# Patient Record
Sex: MALE | Race: WHITE | Employment: FULL TIME | URBAN - METROPOLITAN AREA
[De-identification: names, ages, dates, MRNs, and addresses within clinical notes are randomized per-mention and may not be internally consistent; named-entity substitution may affect disease eponyms.]

---

## 2021-03-04 ENCOUNTER — HOSPITAL ENCOUNTER (OUTPATIENT)
Dept: PREADMISSION TESTING | Age: 36
Discharge: HOME | End: 2021-03-04

## 2021-03-04 VITALS
HEART RATE: 66 BPM | RESPIRATION RATE: 20 BRPM | WEIGHT: 194.45 LBS | SYSTOLIC BLOOD PRESSURE: 131 MMHG | TEMPERATURE: 97.5 F | DIASTOLIC BLOOD PRESSURE: 65 MMHG | OXYGEN SATURATION: 96 % | BODY MASS INDEX: 27.84 KG/M2 | HEIGHT: 70 IN

## 2021-03-04 RX ORDER — BISMUTH SUBSALICYLATE 262 MG
1 TABLET,CHEWABLE ORAL DAILY
COMMUNITY

## 2021-03-04 RX ORDER — ASPIRIN 325 MG
650 TABLET ORAL AS NEEDED
COMMUNITY
End: 2021-03-12

## 2021-03-04 NOTE — PERIOP NOTES
[address] , [address] [facility]   MAIN OR                                  [phone]   MAIN PRE OP                          [phone]                                                                                AMBULATORY PRE OP          [phone]  PRE-ADMISSION TESTING    [phone]   Surgery Date:  Friday 3/12/21      Is surgery arrival time given by surgeon? NO  If [facility] staff will call you Thursday 3/11/21  between 3 and 7pm the day before your surgery with your arrival time. (If your surgery is on a Monday, we will call you the Friday before.)    Call [phone] after 7pm Monday-Friday if you did not receive this call. INSTRUCTIONS BEFORE YOUR SURGERY   When You  Arrive Arrive at the [address] [facility] on the day of your surgery  Have your insurance card, photo ID, and any copayment (if needed)   Food   and   Drink NO food or drink after midnight the night before surgery    This means NO water, gum, mints, coffee, juice, etc.  No alcohol (beer, wine, liquor) 24 hours before and after surgery   Medications to   TAKE   Morning of Surgery MEDICATIONS TO TAKE THE MORNING OF SURGERY WITH A SIP OF WATER:    none   Medications  To  STOP      7 days before surgery  Non-Steroidal anti-inflammatory Drugs (NSAID's): for example, Ibuprofen (Advil, Motrin), Naproxen (Aleve)   Aspirin, if taking for pain    Herbal supplements, vitamins, and fish oil     Blood  Thinners  If you take  Aspirin, Plavix, Coumadin, or any blood-thinning or anti-blood clot medicine, talk to the doctor who prescribed the medications for pre-operative instructions.    Bathing Clothing  Jewelry  Valuables      MAY shower the morning of surgery, please do not apply anything to your skin (lotions, powders, deodorant, or makeup, especially mascara)   Follow Chlorhexidine Care Fusion body wash instructions provided to you during PAT appointment. Begin 3 days prior to surgery.  • Do not shave or trim anywhere 24 hours before surgery  • Wear your hair loose or down; no pony-tails, buns, or metal hair clips  • Wear loose, comfortable, clean clothes  • Wear glasses instead of contacts  • Leave money, valuables, and jewelry, including body piercings, at home   Going Home - or Spending the Night • SAME-DAY SURGERY: You must have a responsible adult drive you home and stay with you 24 hours after surgery  • ADMITS: If your doctor is keeping you in the hospital after surgery, leave personal belongings/luggage in your car until you have a hospital room number.    Hospital discharge time is 12 noon  Drivers must be here before 12 noon unless you are told differently   Special Instructions · Bring PTA Medication list day of surgery with the last doses taken documented   · Do not bring medication bottles the day of surgery          It is now mandated that all surgical patients be tested for COVID-19 prior to surgery. Testing has to be exactly 4 days prior to surgery.       Your COVID test date is Monday 3/8/21 between 8:00 am and 11:00 am.       COVID testing will be performed curbside at the [facility] entrance. There will be signs leading you to the testing site. You will need to bring a photo ID with you to be swabbed.       Patients are advised to self-quarantine at home after testing and prior to your surgery date. You will be notified if your results are positive.    What to watch for:  • Coronavirus (COVID-19) affects different people in different ways  • It also appears with a wide range of symptoms from mild to severe  • Signs usually appear 2-14 days after exposure    • If you develop any of the following, notify your doctor immediately:  o Fever  o Chills, with or without a shiver  o Muscle pain  o Headache  o Sore throat  o Dry cough  o New loss of taste or smell  o Tiredness    •  If you develop any of the following, call  911:  o Shortness of breath  o Difficulty breathing  o Chest pain  o New confusion  o Blueness of fingers and/or lips     Follow all instructions so your surgery wont be cancelled. Please, be on time. If a situation occurs and you are delayed the day of surgery, call [phone] or [phone]. If your physical condition changes (like a fever, cold, flu, etc.) call your surgeon. Home medication(s) reviewed and verified via     LIST   VERBAL   during PAT appointment. The patient was contacted by      IN-PERSON  The patient verbalizes understanding of all instructions and      DOES NOT   need reinforcement.
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
## 2021-03-08 ENCOUNTER — HOSPITAL ENCOUNTER (OUTPATIENT)
Dept: PREADMISSION TESTING | Age: 36
Discharge: HOME | End: 2021-03-08
Payer: COMMERCIAL

## 2021-03-08 PROCEDURE — U0003 INFECTIOUS AGENT DETECTION BY NUCLEIC ACID (DNA OR RNA); SEVERE ACUTE RESPIRATORY SYNDROME CORONAVIRUS 2 (SARS-COV-2) (CORONAVIRUS DISEASE [COVID-19]), AMPLIFIED PROBE TECHNIQUE, MAKING USE OF HIGH THROUGHPUT TECHNOLOGIES AS DESCRIBED BY CMS-2020-01-R: HCPCS

## 2021-03-09 LAB — SARS-COV-2, COV2NT: NOT DETECTED

## 2021-03-11 ENCOUNTER — ANESTHESIA EVENT (OUTPATIENT)
Dept: SURGERY | Age: 36
End: 2021-03-11
Payer: COMMERCIAL

## 2021-03-12 ENCOUNTER — ANESTHESIA (OUTPATIENT)
Dept: SURGERY | Age: 36
End: 2021-03-12
Payer: COMMERCIAL

## 2021-03-12 ENCOUNTER — APPOINTMENT (OUTPATIENT)
Dept: GENERAL RADIOLOGY | Age: 36
End: 2021-03-12
Attending: PODIATRIST
Payer: COMMERCIAL

## 2021-03-12 ENCOUNTER — HOSPITAL ENCOUNTER (OUTPATIENT)
Age: 36
Setting detail: OUTPATIENT SURGERY
Discharge: HOME | End: 2021-03-12
Attending: PODIATRIST | Admitting: PODIATRIST
Payer: COMMERCIAL

## 2021-03-12 VITALS
RESPIRATION RATE: 20 BRPM | DIASTOLIC BLOOD PRESSURE: 73 MMHG | HEART RATE: 71 BPM | TEMPERATURE: 97.6 F | BODY MASS INDEX: 27.9 KG/M2 | WEIGHT: 194.45 LBS | SYSTOLIC BLOOD PRESSURE: 130 MMHG | OXYGEN SATURATION: 92 %

## 2021-03-12 PROBLEM — M20.21 HALLUX RIGIDUS, RIGHT FOOT: Status: ACTIVE | Noted: 2021-03-12

## 2021-03-12 PROBLEM — M79.671 RIGHT FOOT PAIN: Status: ACTIVE | Noted: 2021-03-12

## 2021-03-12 PROBLEM — M20.5X1 OTHER DEFORMITIES OF TOE(S) (ACQUIRED), RIGHT FOOT: Status: ACTIVE | Noted: 2021-03-12

## 2021-03-12 PROBLEM — Q66.211: Status: ACTIVE | Noted: 2021-03-12

## 2021-03-12 PROBLEM — M20.11 HALLUX VALGUS OF RIGHT FOOT: Status: ACTIVE | Noted: 2021-03-12

## 2021-03-12 PROCEDURE — 77030040361 HC SLV COMPR DVT MDII -B

## 2021-03-12 PROCEDURE — 2709999900 HC NON-CHARGEABLE SUPPLY: Performed by: PODIATRIST

## 2021-03-12 PROCEDURE — 74011250636 HC RX REV CODE- 250/636: Performed by: ANESTHESIOLOGY

## 2021-03-12 PROCEDURE — 73630 X-RAY EXAM OF FOOT: CPT

## 2021-03-12 PROCEDURE — 74011250636 HC RX REV CODE- 250/636: Performed by: NURSE ANESTHETIST, CERTIFIED REGISTERED

## 2021-03-12 PROCEDURE — 77030037938 HC BLD OSC SAW TREA -B: Performed by: PODIATRIST

## 2021-03-12 PROCEDURE — 77030010507 HC ADH SKN DERMBND J&J -B: Performed by: PODIATRIST

## 2021-03-12 PROCEDURE — 77030031139 HC SUT VCRL2 J&J -A: Performed by: PODIATRIST

## 2021-03-12 PROCEDURE — 77030003601 HC NDL NRV BLK BBMI -A

## 2021-03-12 PROCEDURE — 76060000036 HC ANESTHESIA 2.5 TO 3 HR: Performed by: PODIATRIST

## 2021-03-12 PROCEDURE — 77030013837 HC NERV BLK KT BBMI -B

## 2021-03-12 PROCEDURE — C1734 ORTH/DEVIC/DRUG BN/BN,TIS/BN: HCPCS | Performed by: PODIATRIST

## 2021-03-12 PROCEDURE — 74011000250 HC RX REV CODE- 250: Performed by: NURSE ANESTHETIST, CERTIFIED REGISTERED

## 2021-03-12 PROCEDURE — 77030002916 HC SUT ETHLN J&J -A: Performed by: PODIATRIST

## 2021-03-12 PROCEDURE — 77030016524 HC BIT DRL4 STRY -C: Performed by: PODIATRIST

## 2021-03-12 PROCEDURE — 77030002922 HC SUT FBRWRE ARTH -B: Performed by: PODIATRIST

## 2021-03-12 PROCEDURE — C1713 ANCHOR/SCREW BN/BN,TIS/BN: HCPCS | Performed by: PODIATRIST

## 2021-03-12 PROCEDURE — 77030002986 HC SUT PROL J&J -A: Performed by: PODIATRIST

## 2021-03-12 PROCEDURE — 74011000250 HC RX REV CODE- 250: Performed by: ANESTHESIOLOGY

## 2021-03-12 PROCEDURE — 77030006773 HC BLD SAW OSC BRSM -A: Performed by: PODIATRIST

## 2021-03-12 PROCEDURE — 74011250636 HC RX REV CODE- 250/636

## 2021-03-12 PROCEDURE — 76210000020 HC REC RM PH II FIRST 0.5 HR: Performed by: PODIATRIST

## 2021-03-12 PROCEDURE — 74011250636 HC RX REV CODE- 250/636: Performed by: PODIATRIST

## 2021-03-12 PROCEDURE — 76210000006 HC OR PH I REC 0.5 TO 1 HR: Performed by: PODIATRIST

## 2021-03-12 PROCEDURE — 76010000132 HC OR TIME 2.5 TO 3 HR: Performed by: PODIATRIST

## 2021-03-12 PROCEDURE — 77030040922 HC BLNKT HYPOTHRM STRY -A

## 2021-03-12 PROCEDURE — 77030000032 HC CUF TRNQT ZIMM -B: Performed by: PODIATRIST

## 2021-03-12 PROCEDURE — 77030010507 HC ADH SKN DERMBND J&J -B

## 2021-03-12 PROCEDURE — 74011000250 HC RX REV CODE- 250: Performed by: PODIATRIST

## 2021-03-12 PROCEDURE — 77030031718 HC ROD POS EASYCLP DISP STRY -C: Performed by: PODIATRIST

## 2021-03-12 DEVICE — ANATOMIC BIPLANAR IMPLANTS
Type: IMPLANTABLE DEVICE | Site: FOOT | Status: FUNCTIONAL
Brand: LAPIPLASTY SYSTEM 2

## 2021-03-12 DEVICE — INJECTABLE KIT
Type: IMPLANTABLE DEVICE | Site: FOOT | Status: FUNCTIONAL
Brand: AUGMENT® INJECTABLE

## 2021-03-12 DEVICE — OSTEOSYNTHESIS COMPRESSION STAPLE
Type: IMPLANTABLE DEVICE | Site: FOOT | Status: FUNCTIONAL
Brand: EASY CLIP

## 2021-03-12 RX ORDER — MIDAZOLAM HYDROCHLORIDE 1 MG/ML
INJECTION, SOLUTION INTRAMUSCULAR; INTRAVENOUS AS NEEDED
Status: DISCONTINUED | OUTPATIENT
Start: 2021-03-12 | End: 2021-03-12 | Stop reason: HOSPADM

## 2021-03-12 RX ORDER — SODIUM CHLORIDE, SODIUM LACTATE, POTASSIUM CHLORIDE, CALCIUM CHLORIDE 600; 310; 30; 20 MG/100ML; MG/100ML; MG/100ML; MG/100ML
125 INJECTION, SOLUTION INTRAVENOUS CONTINUOUS
Status: DISCONTINUED | OUTPATIENT
Start: 2021-03-12 | End: 2021-03-12 | Stop reason: HOSPADM

## 2021-03-12 RX ORDER — ROPIVACAINE HYDROCHLORIDE 5 MG/ML
INJECTION, SOLUTION EPIDURAL; INFILTRATION; PERINEURAL AS NEEDED
Status: DISCONTINUED | OUTPATIENT
Start: 2021-03-12 | End: 2021-03-12 | Stop reason: HOSPADM

## 2021-03-12 RX ORDER — PROPOFOL 10 MG/ML
INJECTION, EMULSION INTRAVENOUS AS NEEDED
Status: DISCONTINUED | OUTPATIENT
Start: 2021-03-12 | End: 2021-03-12 | Stop reason: HOSPADM

## 2021-03-12 RX ORDER — FLUMAZENIL 0.1 MG/ML
0.2 INJECTION INTRAVENOUS
Status: DISCONTINUED | OUTPATIENT
Start: 2021-03-12 | End: 2021-03-12 | Stop reason: HOSPADM

## 2021-03-12 RX ORDER — NALOXONE HYDROCHLORIDE 0.4 MG/ML
0.2 INJECTION, SOLUTION INTRAMUSCULAR; INTRAVENOUS; SUBCUTANEOUS
Status: DISCONTINUED | OUTPATIENT
Start: 2021-03-12 | End: 2021-03-12 | Stop reason: HOSPADM

## 2021-03-12 RX ORDER — FENTANYL CITRATE 50 UG/ML
INJECTION, SOLUTION INTRAMUSCULAR; INTRAVENOUS AS NEEDED
Status: DISCONTINUED | OUTPATIENT
Start: 2021-03-12 | End: 2021-03-12 | Stop reason: HOSPADM

## 2021-03-12 RX ORDER — ONDANSETRON 2 MG/ML
INJECTION INTRAMUSCULAR; INTRAVENOUS AS NEEDED
Status: DISCONTINUED | OUTPATIENT
Start: 2021-03-12 | End: 2021-03-12 | Stop reason: HOSPADM

## 2021-03-12 RX ORDER — DIPHENHYDRAMINE HYDROCHLORIDE 50 MG/ML
12.5 INJECTION, SOLUTION INTRAMUSCULAR; INTRAVENOUS AS NEEDED
Status: DISCONTINUED | OUTPATIENT
Start: 2021-03-12 | End: 2021-03-12 | Stop reason: HOSPADM

## 2021-03-12 RX ORDER — HYDROMORPHONE HYDROCHLORIDE 1 MG/ML
.25-1 INJECTION, SOLUTION INTRAMUSCULAR; INTRAVENOUS; SUBCUTANEOUS
Status: DISCONTINUED | OUTPATIENT
Start: 2021-03-12 | End: 2021-03-12 | Stop reason: HOSPADM

## 2021-03-12 RX ORDER — LIDOCAINE HYDROCHLORIDE 10 MG/ML
0.1 INJECTION, SOLUTION EPIDURAL; INFILTRATION; INTRACAUDAL; PERINEURAL AS NEEDED
Status: DISCONTINUED | OUTPATIENT
Start: 2021-03-12 | End: 2021-03-12 | Stop reason: HOSPADM

## 2021-03-12 RX ORDER — ROCURONIUM BROMIDE 10 MG/ML
INJECTION, SOLUTION INTRAVENOUS AS NEEDED
Status: DISCONTINUED | OUTPATIENT
Start: 2021-03-12 | End: 2021-03-12 | Stop reason: HOSPADM

## 2021-03-12 RX ORDER — DEXAMETHASONE SODIUM PHOSPHATE 4 MG/ML
INJECTION, SOLUTION INTRA-ARTICULAR; INTRALESIONAL; INTRAMUSCULAR; INTRAVENOUS; SOFT TISSUE AS NEEDED
Status: DISCONTINUED | OUTPATIENT
Start: 2021-03-12 | End: 2021-03-12 | Stop reason: HOSPADM

## 2021-03-12 RX ADMIN — PROPOFOL 250 MG: 10 INJECTION, EMULSION INTRAVENOUS at 15:28

## 2021-03-12 RX ADMIN — MIDAZOLAM HYDROCHLORIDE 2 MG: 2 INJECTION, SOLUTION INTRAMUSCULAR; INTRAVENOUS at 13:34

## 2021-03-12 RX ADMIN — BUPIVACAINE HYDROCHLORIDE 10 ML/HR: 7.5 INJECTION, SOLUTION EPIDURAL; RETROBULBAR at 18:32

## 2021-03-12 RX ADMIN — ROCURONIUM BROMIDE 50 MG: 10 INJECTION INTRAVENOUS at 15:28

## 2021-03-12 RX ADMIN — SODIUM CHLORIDE, POTASSIUM CHLORIDE, SODIUM LACTATE AND CALCIUM CHLORIDE: 600; 310; 30; 20 INJECTION, SOLUTION INTRAVENOUS at 16:24

## 2021-03-12 RX ADMIN — DEXAMETHASONE SODIUM PHOSPHATE 8 MG: 4 INJECTION, SOLUTION INTRAMUSCULAR; INTRAVENOUS at 15:40

## 2021-03-12 RX ADMIN — FENTANYL CITRATE 100 MCG: 0.05 INJECTION, SOLUTION INTRAMUSCULAR; INTRAVENOUS at 13:34

## 2021-03-12 RX ADMIN — ROPIVACAINE HYDROCHLORIDE 30 ML: 5 INJECTION, SOLUTION EPIDURAL; INFILTRATION; PERINEURAL at 13:41

## 2021-03-12 RX ADMIN — ONDANSETRON HYDROCHLORIDE 4 MG: 2 SOLUTION INTRAMUSCULAR; INTRAVENOUS at 15:39

## 2021-03-12 RX ADMIN — CEFAZOLIN SODIUM 2 G: 1 POWDER, FOR SOLUTION INTRAMUSCULAR; INTRAVENOUS at 15:17

## 2021-03-12 RX ADMIN — ROPIVACAINE HYDROCHLORIDE 10 ML: 5 INJECTION, SOLUTION EPIDURAL; INFILTRATION; PERINEURAL at 13:49

## 2021-03-12 RX ADMIN — SODIUM CHLORIDE, POTASSIUM CHLORIDE, SODIUM LACTATE AND CALCIUM CHLORIDE 125 ML/HR: 600; 310; 30; 20 INJECTION, SOLUTION INTRAVENOUS at 12:55

## 2021-03-12 RX ADMIN — MIDAZOLAM HYDROCHLORIDE 2 MG: 2 INJECTION, SOLUTION INTRAMUSCULAR; INTRAVENOUS at 15:17

## 2021-03-12 NOTE — ANESTHESIA PREPROCEDURE EVALUATION
Relevant Problems   No relevant active problems       Anesthetic History   No history of anesthetic complications            Review of Systems / Medical History  Patient summary reviewed and pertinent labs reviewed    Pulmonary  Within defined limits                 Neuro/Psych   Within defined limits           Cardiovascular  Within defined limits                Exercise tolerance: >4 METS     GI/Hepatic/Renal  Within defined limits              Endo/Other  Within defined limits           Other Findings              Physical Exam    Airway  Mallampati: II  TM Distance: 4 - 6 cm  Neck ROM: normal range of motion   Mouth opening: Normal     Cardiovascular  Regular rate and rhythm,  S1 and S2 normal,  no murmur, click, rub, or gallop  Rhythm: regular  Rate: normal         Dental  No notable dental hx       Pulmonary  Breath sounds clear to auscultation               Abdominal  GI exam deferred       Other Findings            Anesthetic Plan    ASA: 1  Anesthesia type: regional and MAC - popliteal fossa block and saphenous block      Post-op pain plan if not by surgeon: peripheral nerve block continuous    Induction: Intravenous  Anesthetic plan and risks discussed with: Patient

## 2021-03-12 NOTE — BRIEF OP NOTE
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 Procedure(s):  - FIRST TARSOMETATARSAL JOINT FUSION RIGHT FOOT  - BUNIONECTOMY WITH POSSIBLE HALLUX OSTEOTOMY RIGHT FOOT     Surgeon(s):  Angus Lazaro DPM    Surgical Assistant: Surg Asst-1: Camacho Greene LPN    Anesthesia: General LMA with regional block    Estimated Blood Loss (mL): Minimal    Complications: None    Specimens: * No specimens in log *     Implants:   Implant Name Type Inv.  Item Serial No.  Lot No. LRB No. Used Action   INJECTABLE [id number] AUG - SNA  INJECTABLE [id number] AUG NA [id number] Right 1 Implanted   IMPL BIPLANAR MARK STRL -- LAPIPLASTY SYSTEM 2 - SNA  IMPL BIPLANAR MARK STRL -- LAPIPLASTY SYSTEM 2 NA [employer] [id number] Right 1 Implanted   STAPLE INT [id number] 15MM DIA2MM [id number] NIT ANK FT SUP E - SNA  STAPLE INT [id number] 15MM DIA2MM [id number] NIT ANK FT SUP E NA [employer]WD [id number] Right 1 Implanted       Drains: * No LDAs found *    Findings: arthritic 1st MTP joint; improved ROM to the 1st MTP     Electronically Signed by Garry García DPM on 3/12/2021 at 6:25 PM

## 2021-03-12 NOTE — INTERVAL H&P NOTE
Update History & Physical 
 
The Patient's History and Physical of March 4, 2021 was reviewed with the patient and I examined the patient. There was no change. The surgical site was confirmed by the patient and me. Plan:  The risk, benefits, expected outcome, and alternative to the recommended procedure have been discussed with the patient. Patient understands and wants to proceed with the procedure.  
 
Electronically signed by Maricruz Moseley DPM on 3/12/2021 at 12:44 PM

## 2021-03-12 NOTE — PERIOP NOTES
Surgeon notified of Iodine contrast allergy before putting dressing of adaptic soaked in betadine on surgical wound.

## 2021-03-12 NOTE — ANESTHESIA PROCEDURE NOTES
Peripheral Block    Start time: 3/12/2021 1:34 PM  End time: 3/12/2021 1:49 PM  Performed by: Ilene Barksdale MD  Authorized by: Ilene Barksdale MD       Pre-procedure: Indications: at surgeon's request and post-op pain management    Preanesthetic Checklist: patient identified, risks and benefits discussed, site marked, timeout performed, anesthesia consent given and patient being monitored    Timeout Time: 13:34          Block Type:   Block Type:  Popliteal and saphenous  Laterality:  Right  Monitoring:  Continuous pulse ox, frequent vital sign checks, heart rate, responsive to questions and oxygen  Injection Technique:  Continuous  Procedures: ultrasound guided    Patient Position: supine  Prep: chlorhexidine    Location:  Upper thigh  Needle Type:  Tuohy  Needle Gauge:  18 G  Needle Localization:  Ultrasound guidance    Assessment:  Number of attempts:  1  Injection Assessment:  Incremental injection every 5 mL, local visualized surrounding nerve on ultrasound, negative aspiration for blood, no paresthesia and no intravascular symptoms  Patient tolerance:  Patient tolerated the procedure well with no immediate complications  Saphenous block performed with ultrasound guidance; 4\" stimuplex 21g needle used, 10cc 0.5% ropivacaine injected slowly with intermittent aspiration.

## 2021-03-12 NOTE — OP NOTES
Operative Report    Patient: Isabel Nunez MRN: [id number]  SSN: [id number]    YOB: 1985  Age: 38 y.o. 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 Patient attempted non-surgical treatment with otc devices, nsaids, ice, rest, shoe changes without resolution of his symptoms. Radiographic imaging and physical exam showed a varus deviation of the first metatarsal at the unstable and rotated first tarsometatarsal joint with distal hallux abductovalgus deformity. I discussed surgical correction due to failed non-surgical management and radiographic findings. I discussed the procedures at length including the expected post operative course, risks, alternatives, and possible complications. No guarantees given and the patient elected to proceed.   Details of the surgical procedure were discussed in detail with the patient including incision site(s), length of incision(s), removal of soft tissue and or bone, placement of screws, plates, and closure with sutures. The benefits, the alternatives, the risks, complications, and the pre-operative, intra-operative, and post-op management for the procedure(s) were discussed in detail with the patient. Surgical and nonsurgical alternatives to the recommended operation were discussed with the patient with all questions addressed and answered. Included in these alternatives were: (1) changes in shoe gear (2) medication (3) Rest, and ice (4) changes in job or activity level (5) observation (6) do nothing. Risks and complications were also discussed with the patient and include but are not limited to: infection of the surgical site of the soft tissue structures or the bone, chronic inflammation, delayed-healing or non-healing of incision, continued pain or worsening pain or symptoms, excessive bleeding, excessive and chronic swelling, allergic reaction to implanted sutures/screws/plates, adverse reaction to anesthesia, loss of toe, foot, or entire lower extremity due to gangrene or vascular embarrassment, recurrence of the deformity, increase of the deformity, significant or chronic pain, reflex sympathetic dystrophy, damage to neuro-vascular bundle, complete or partial dehiscence of wound, permanent scar, need for further surgery or repeat procedures, chronic or traumatic arthritis, failure of the procedure to alleviate pain, symptoms, or deformity, deep vein thrombosis or pulmonary emboli, myocardial infarction or stroke during procedure, post-operative period, or during rehabilitation phase, permanent or partial numbness in and around the area of the surgery, and possible death. The patient understands the benefits, the alternatives, the post-op management, and the risks and complications and has opted for the above stated procedures. No contraindication to surgery noted. All questions were satisfactorily answered.  No guarantees were given nor implied as far as possible outcomes are concerned. Patient understands that the diagnosis is based within a reasonable degree of medical certainty and to the doctors best ability based on a combination of medical history, clinical examination findings, and diagnostic tests. While this is based on a reasonable degree of medical certainty and the doctors best ability, the patient understands that there could be a difference in the surgical findings from the diagnosis made and there is no certainty or guarantee as far as the diagnosis itself. Furthermore, the patient understands and acknowledges that despite surgeons best efforts unforeseeable circumstances or complications can occur which may result in suboptimal outcomes. Oral and written consent for surgery was given by the patient and is signed and in the chart         Procedure in Detail:   The patient was identified, and the operative extremity was marked in the preoperative holding area.  A preoperative popliteal and saphenous nerve block was performed by anesthesia to the right lower extremity.  The patient was then brought back to the operating room under mild sedation. He was then transferred to the operating bed in supine position.  A pneumatic ankle tourniquet was then placed about the patient's right ankle.  After adequate anesthesia was obtained by the anesthesiologist, the right lower extremity was then scrubbed, prepped, and draped in the usual aseptic manner. An Esmarch was utilized and the right foot and ankle was exsanguinated, and the pneumatic ankle tourniquet was inflated to 200mmHg. Fusion 1st tarsometatarsal joint right foot:   Attention was then directed to the right foot where there was a large medial eminence at the first metatarsal head due to instability at the first tarsometatarsal joint. A dorsal linear incision was made over the first tarsometatarsal joint approximately 5 cm in length.  Incision was deepened using sharp and blunt dissection with care taken to identify and retract vital neurovascular structures. Next soft tissue attachments were dissected free from the base of the first metatarsal as well as the medial cuneiform. A saw was then the inserted into the joint to resected any plantar shelf. Next a joystick pin was inserted into the base of the first metatarsal. Fluoroscopy was used to confirm full reduction of the deformity including the rotational reduction of the sesamoids. Next a clamp was used to reduce the first intermetatarsal angle to zero with correction of the rotational deformity and sesamoid position. This was then held in position. The cut guide was then applied to the joint. The joint surfaces were then resected. The resected bone was then passed from the field. The base of the first metatarsal was further planned to allow for plantarflexion of the first metatarsal. The adjacent sides of the joint were then fenestrated and drilled. Augment was infiltrated to the joint to aid in fusion. The joint was then compressed with the compressor. Radiographs confirmed correction of first metatarsal deformity. Temporary fixation performed using 2 threaded k-wires. Next the joint was permanently fixated using lapiplasty 2 plates and screws at 90 degrees to each other. Deformity correction was then assessed and noted to be rectus. No intercuneiform instability was noted  Under live fluoroscopy. The wound was then irrigated with copious amounts of normal saline and closed in layers. Bunionectomy with Akin/hallux osteotomy  Next attention was directed to the deformity of the right first metatarsophalangeal joint. A linear incision was made lateral to the first metatarsophalangeal joint approximately 2 cm in length. This incision was deepened using sharp and blunt dissection with care taken to identify and retract vital neurovascular structures. This expose the lateral aspect of the first metatarsophalangeal joint.  A lateral capsulotomy was performed with release of the collateral ligaments around the fibular sesamod. Soft tissue attachments of the base of the lateral condyle of the proximal phalanx were also released. Next the lateral contracture of the hallux at the first metatarsophalangeal joint was checked and noted to be reduced. Next another incision was made to the medial aspect of the first metatarsophalangeal joint approximately 3 cm in length. Incision was deepened using sharp and blunt dissection with care taken to identify and retract the neurovascular structures in the area. These were retracted dorsally and plantarly. Next a lenticular capsulotomy was performed to the dorsal medial aspect first metatarsophalangeal joint periosteal and capsular dissection was then performed and reflected plantarly and dorsally. The medial eminence of the first metatarsal was resected and passed from the field using a saw. Next a transverse wedge type osteotomy was performed to the metaphysis of the proximal phalanx of the hallux with the base medial and apex lateral maintaining the lateral cortical bone. The interposing bone was then removed and passed from the field. The osteotomy was then closed using manual reduction. Next, a staple was applied from [employer] to hold to osteotomy in place after drilling. This was confirmed using fluoroscopy showing anatomic alignment of the first metatarsophalangeal joint. The wounds were then irrigated with copious amounts of normal saline and closed in layers. Patient tolerated the procedure and anesthesia well. The pneumatic tourniquet was deflated with prompt hyperemic response to the right foot. Postoperative dressing was applied including a posterior splint. Patient was then transferred to the PACU for postoperative monitoring. From there he will be discharged home and follow up in my office next week.         Estimated Blood Loss:  minimal    Tourniquet Time:   Total Tourniquet Time Documented:  Calf (Right) - 140 minutes  Total: Calf (Right) - 140 minutes        Implants:   Implant Name Type Inv. Item Serial No.  Lot No. LRB No. Used Action   INJECTABLE [id number] AUG - SNA  INJECTABLE [id number] AUG NA [id number] Right 1 Implanted   IMPL BIPLANAR AMRK STRL -- LAPIPLASTY SYSTEM 2 - SNA  IMPL BIPLANAR MARK STRL -- LAPIPLASTY SYSTEM 2 NA [employer] [id number] Right 1 Implanted   STAPLE INT [id number] 15MM DIA2MM [id number] NIT ANK FT SUP E - SNA  STAPLE INT [id number] 15MM DIA2MM [id number] NIT ANK FT SUP E NA [employer] [id number] Right 1 Implanted               Specimens: * No specimens in log *        Drains: None                Complications: None    Counts: Sponge and needle counts were correct times two.     Signed By:  Jazlyn Starks DPM     March 12, 2021

## 2021-03-13 NOTE — DISCHARGE INSTRUCTIONS
Dr. Aj Kirk. Isabel Mitchell  The [address] [facility] [place]  Post Operative Instructions: You have had a surgical procedure on your right foot. Fluids and Diet:   Begin with clear liquids, broth, dry toast, and crackers.  If not nauseated then resume your regular pre-operative diet when you are ready    Medications:   Take your prescriptions as directed   Begin aspirin starting tomorrow.  DO NOT take any anti-inflammatory medications (Ibuprofen, aleve, motrin, advil, etc) unless cleared by Dr. Alysia Henderson or the on-call doctor.  If your pain is not severe then you may take the non-prescription medication that you normally take for aches and pains   You may resume your regularly scheduled medications (unless otherwise directed)   If any side effects or adverse reactions occur, discontinue the medication and contact your doctor.  Review the patient drug information that is provided before you take any medication    Ambulation and Activity:   You are advised to go directly home from the hospital   Use crutches/walker/knee scooter as needed   You may not put weight on the operated foot. You should wear the surgical shoe at all times when awake.  Avoid stairs if possible.  Do not lift or move heavy objects   Do not drive until cleared by Dr. Janeth Lux and Wound Care Instructions:   Keep bandage clean and dry   Your incision sites were covered with betadine/iodine - please monitor foot for increased itching, irritation, burning and contact Dr. Alysia Henderson or the on-call doctor with questions   Do not shower or bathe the operative extremity   Do not remove the bandage (unless otherwise directed)   Do not attempt to put anything between the cast or dressing and your skin, some itching is normal.    Ice and Elevation:   Elevate operative extremity as much as possible to reduce swelling and discomfort.  Elevate with 2 pillows at or above the level of the heart for the first 72 hours.    Ice:  [facility] dispensed insulated ice bag over the bandage 20 minutes of every hour while awake for the first 72 hours. You may behind the knee as well. Special Instructions: Call your doctor immediately if you develop any of the following.  Fever over 100 degrees by mouth - take your temperature daily until your first follow up visit.  Pain not relieved by medication ordered   Swelling, increased redness, warmth, or hardness around operative area.  Numb, tingling or cold toes.  Toe(s) become white or bluish   Bandage becomes wet, soiled, or blood soaked (small amount of bleeding may be normal)   Increased or progressive drainage from surgical area. Follow up instructions: Your first post operative appointment is scheduled for: Tuesday    Call Dr. Buck Poster office if you have any questions or concerns. DISCHARGE SUMMARY from your Nurse      PATIENT INSTRUCTIONS    After general anesthesia or intravenous sedation, for 24 hours or while taking prescription Narcotics:  · Limit your activities  · Do not drive and operate hazardous machinery  · Do not make important personal or business decisions  · Do  not drink alcoholic beverages  · If you have not urinated within 8 hours after discharge, please contact your surgeon on call. Report the following to your surgeon:  · Excessive pain, swelling, redness or odor of or around the surgical area  · Temperature over 100.5  · Nausea and vomiting lasting longer than 4 hours or if unable to take medications  · Any signs of decreased circulation or nerve impairment to extremity: change in color, persistent  numbness, tingling, coldness or increase pain  · Any questions      GOOD HELP TO FIGHT AN INFECTION  Here are a few tip to help reduce the chance of getting an infection after surgery:   Wash Your Hands   Good handwashing is the most important thing you and your caregiver can do.  Wash before and after caring for any wounds. Dry your hand with a clean towel.   [facility] with soap and water for at least 20 seconds. A TIP: sing the \"Happy Birthday\" song through one time while washing to help with the timing.  Use a hand  in between washings.  Shower   When your surgeon says it is OK to take a shower, use a new bar of antibacterial soap (if that is what you use, and keep that bar of soap ONLY for your use), or antibacterial body wash.  Use a clean wash cloth or sponge when you bathe.  Dry off with a clean towel  after every bath - be careful around any wounds, skin staples, sutures or surgical glue over/on wounds.  Do not enter swimming pools, hot tubs, lakes, rivers and/or ocean until wounds are healed and your doctor/surgeon says it is OK.  Use Clean Sheets   Sleep on freshly laundered sheets after your surgery.  Keep the surgery site covered with a clean, dry bandage (if instructed to do so). If the bandage becomes soiled, reapply a new, dry, clean bandage.  Do not allow pets to sleep with you while your wound is healing.  Lifestyle Modification and Controlling Your Blood Sugar   Smoking slows wound healing. Stop smoking and limit exposure to second-hand smoke.  High blood sugar slows wound healing. Eat a well-balanced diet to provide proper nutrition while healing   Monitor your blood sugar (if you are a diabetic) and take your medications as you are suppose to so you can control you blood sugar after surgery. COUGH AND DEEP BREATHE    Breathing deeply and coughing are very important exercises to do after surgery. Deep breathing and coughing open the little air tubes and air sacks in your lungs. You take deep breaths every day. You may not even notice - it is just something you do when you sigh or yawn. It is a natural exercise you do to keep these air passages open. After surgery, take deep breaths and cough, on purpose. DIRECTIONS:  · Take 10 to 15 slow deep breaths every hour while awake.   · Breathe in deeply, and hold it for 2 seconds. · Exhale slowly through puckered lips, like blowing up a balloon. · After every 4th or 5th deep breath, hug your pillow to your chest or belly and give a hard, deep cough. Yes, it will probably hurt. But doing this exercise is a very important part of healing after surgery. Take your pain medicine to help you do this exercise without too much pain. Coughing and deep breathing help prevent bronchitis and pneumonia after surgery. If you had chest or belly surgery, use a pillow as a \"hug jerome\" and hold it tightly to your chest or belly when you cough. ANKLE PUMPS    Ankle pumps increase the circulation of oxygenated blood to your lower extremities and decrease your risk for circulation problems such as blood clots. They also stretch the muscles, tendons and ligaments in your foot and ankle, and prevent joint contracture in the ankle and foot, especially after surgeries on the legs. It is important to do ankle pump exercises regularly after surgery because immobility increases your risk for developing a blood clot. Your doctor may also have you take an Aspirin for the next few days as well. If your doctor did not ask you to take an Aspirin, consult with him before starting Aspirin therapy on your own. The exercise is quite simple. · Slowly point your foot forward, feeling the muscles on the top of your lower leg stretch, and hold this position for 5 seconds. · Next, pull your foot back toward you as far as possible, stretching the calf muscles, and hold that position for 5 seconds. · Repeat with the other foot. · Perform 10 repetitions every hour while awake for both ankles if possible (down and then up with the foot once is one repetition). You should feel gentle stretching of the muscles in your lower leg when doing this exercise. If you feel pain, or your range of motion is limited, don't push too hard.   Only go the limit your joint and muscles will let you go. If you have increasing pain, progressively worsening leg warmth or swelling, STOP the exercise and call your doctor. MEDICATION AND   SIDE EFFECT GUIDE    The [facility] MEDICATION AND SIDE EFFECT GUIDE was provided to the PATIENT AND CARE PROVIDER. Information provided includes instruction about drug purpose and common side effects for the following medications:   · ***        These are general instructions for a healthy lifestyle:    *   Please give a list of your current medications to your Primary Care Provider. *   Please update this list whenever your medications are discontinued, doses are changed, or new medications (including over-the-counter products) are added. *   Please carry medication information at all times in case of emergency situations. About Smoking  No smoking / No tobacco products  Avoid exposure to second hand smoke     Surgeon General's Warning:  Quitting smoking now greatly reduces serious risk to your health. Obesity, smoking, and sedentary lifestyle greatly increases your risk for illness and disease. A healthy diet, regular physical exercise & weight monitoring are important for maintaining a healthy lifestyle. Congestive Heart Failure  You may be retaining fluid if you have a history of heart failure or if you experience any of the following symptoms:  Weight gain of 3 pounds or more overnight or 5 pounds in a week, increased swelling in your hands or feet or shortness of breath while lying flat in bed. Please call your doctor as soon as you notice any of these symptoms; do not wait until your next office visit. Recognize signs and symptoms of STROKE:  F -  Face looks uneven  A -  Arms unable to move or move evenly  S -  Speech slurred or non-existent  T -  Time-call 911 as soon as signs and symptoms begin-DO NOT go          back to bed or wait to see if you get better-TIME IS BRAIN.       Warning Signs of HEART ATTACK   Call 911 if you have these symptoms:     Chest discomfort. Most heart attacks involve discomfort in the center of the chest that lasts more than a few minutes, or that goes away and comes back. It can feel like uncomfortable pressure, squeezing, fullness, or pain.  Discomfort in other areas of the upper body. Symptoms can include pain or discomfort in one or both arms, the back, neck, jaw, or stomach.  Shortness of breath with or without chest discomfort.  Other signs may include breaking out in a cold sweat, nausea, or lightheadedness. Don't wait more than five minutes to call 911 - MINUTES MATTER! Fast action can save your life. Calling 911 is almost always the fastest way to get lifesaving treatment. Emergency Medical Services staff can begin treatment when they arrive -- up to an hour sooner than if someone gets to the hospital by car. Learning About Coronavirus [phone])  Coronavirus [phone]): Overview  What is coronavirus (COVID-19)? The coronavirus disease (COVID-19) is caused by a virus. It is an illness that was first found in [place], [place], in December 2019. It has since spread worldwide. The virus can cause fever, cough, and trouble breathing. In severe cases, it can cause pneumonia and make it hard to breathe without help. It can cause death. Coronaviruses are a large group of viruses. They cause the common cold. They also cause more serious illnesses like Middle East respiratory syndrome (MERS) and severe acute respiratory syndrome (SARS). COVID-19 is caused by a novel coronavirus. That means it's a new type that has not been seen in people before. This virus spreads person-to-person through droplets from coughing and sneezing. It can also spread when you are close to someone who is infected. And it can spread when you touch something that has the virus on it, such as a doorknob or a tabletop. What can you do to protect yourself from coronavirus (COVID-19)?   The best way to protect yourself from getting sick is to:  · Avoid areas where there is an outbreak. · Avoid contact with people who may be infected. · Wash your hands often with soap or alcohol-based hand sanitizers. · Avoid crowds and try to stay at least 6 feet away from other people. · Wash your hands often, especially after you cough or sneeze. Use soap and water, and scrub for at least 20 seconds. If soap and water aren't available, use an alcohol-based hand . · Avoid touching your mouth, nose, and eyes. What can you do to avoid spreading the virus to others? To help avoid spreading the virus to others:  · Cover your mouth with a tissue when you cough or sneeze. Then throw the tissue in the trash. · Use a disinfectant to clean things that you touch often. · Stay home if you are sick or have been exposed to the virus. Don't go to school, work, or public areas. And don't use public transportation. · If you are sick:  ? Leave your home only if you need to get medical care. But call the doctor's office first so they know you're coming. And wear a face mask, if you have one.  ? If you have a face mask, wear it whenever you're around other people. It can help stop the spread of the virus when you cough or sneeze. ? Clean and disinfect your home every day. Use household  and disinfectant wipes or sprays. Take special care to clean things that you grab with your hands. These include doorknobs, remote controls, phones, and handles on your refrigerator and microwave. And don't forget countertops, tabletops, bathrooms, and computer keyboards. When to call for help  Call 911 anytime you think you may need emergency care. For example, call if:  · You have severe trouble breathing. (You can't talk at all.)  · You have constant chest pain or pressure. · You are severely dizzy or lightheaded. · You are confused or can't think clearly. · Your face and lips have a blue color.   · You pass out (lose consciousness) or are very hard to wake up. Call your doctor now if you develop symptoms such as:  · Shortness of breath. · Fever. · Cough. If you need to get care, call ahead to the doctor's office for instructions before you go. Make sure you wear a face mask, if you have one, to prevent exposing other people to the virus. Where can you get the latest information? The following health organizations are tracking and studying this virus. Their websites contain the most up-to-date information. Ramónvikimaximo Rojas also learn what to do if you think you may have been exposed to the virus. · U.S. Centers for Disease Control and Prevention (CDC): The CDC provides updated news about the disease and travel advice. The website also tells you how to prevent the spread of infection. www.cdc.gov  · World Health Organization [facility]): WHO offers information about the virus outbreaks. WHO also has travel advice. www.who.int  Current as of: April 1, 2020               Content Version: 12.4  © 2006-2020 [employer]. Care instructions adapted under license by your healthcare professional. If you have questions about a medical condition or this instruction, always ask your healthcare professional. [address] any warranty or liability for your use of this information. The discharge information has been reviewed with the {PATIENT PARENT GUARDIAN:68410}. Any questions and concerns from the {PATIENT PARENT GUARDIAN:53651} have been addressed. The {PATIENT PARENT GUARDIAN:32624} verbalized understanding.         CONTENTS FOUND IN YOUR DISCHARGE ENVELOPE:  [x]     Surgeon and Hospital Discharge Instructions  [x]     [facility] Care Provider Card  [x]     Medication & Side Effect Guide            (your newly prescribed medications have been marked/highlighted showing the most common side effects from the classes of drugs on your prescriptions)  [x]     Medication Prescription(s) x *** ( [] These have been sent electronically to your pharmacy by your surgeon,   - OR -       your surgeon has already provided these to you during a previous/pre-op office visit)  []     Physical Therapy Prescription  [x]     Follow-up Appointment Cards  []     Surgery-related Pictures/Media  [x]     Pain block and/or block with On-Q Catheter from Anesthesia Service (information included in your instructions above)  [x]     Medical device information sheets/pamphlets from their    []     School/work excuse note. []     /parent work excuse note. The following personal items collected during your admission are returned to you:   Dental Appliance: Dental Appliances: Other (comment)(invisiline to pacu)  Vision:    Hearing Aid:    Jewelry: Jewelry: Earrings(to safe)  Clothing: Clothing: Other (comment)(street clothes to [employer])  Other Valuables: Other Valuables: Cell Phone, R[address] sent to safe:         Crutch Ambulation Precautions    Your doctor has ordered crutch use to aid in your post-operative healing and to allow you to get around your home environment as you heal.  Evidence and research has show that early mobilization speeds healing and recovery, but too much activity too soon can slow your progress down. It is important that you follow your doctors orders carefully. Your doctor has prescribed the following for you:    [x]       Non-Weight Bearing with your injured and healing leg until your follow up, and then your progress will be evaluated at that time. []       Non-weight Bearing until your motor-sensory blockade has resolved, and then+:    []       Touch-Down Weight Bearing  []       25% Weight Bearing  []       50% Weight Bearing  []       Advance to Full Weight Bearing as tolerated, crutches for support and as needed. Partial Weight Bearin-point Gait  Non-Weight Bearing: 3-point Swing Through Gait    Crutch walking seems simple when watching someone else do it. However, if not done correctly, it can be very difficult and dangerous. Here are some tips and reminders that will be helpful to you at home. (Non-Weight bearing shown)    1. When coming to a standing position from a sitting position, remember:  a. Place both crutches in the hand opposite the side of your injury. b. Lean forward; push down on crutches and arm of chair. c. Stand up, straighten your good leg and get your balance. Once you are balanced, move crutches to proper place under arms. Get balanced again before attempting to walk. (Non-Weight bearing shown)     2. When Walking, Remember:  a. Place your crutches out approximately 10-12 inches in front of you with a wide enough stance for your hips to fit through. b. Push down on crutch hand rests - this is the only place where your weight is to be distributed. c. Swing your \"good\" leg forward to meet up with the crutch location. Once you get the hang of it, you can swing your \"good\" leg approximately 10-12 inches past the crutch location, but in the beginning when you are just getting comfortable with crutch use, GO SLOW and TAKE YOUR TIME. d. The \"arm pit\" pads are NOT for putting your weight on. Rather, the pads are for squeezing between the inside of your arm and the chest wall to keep crutch from moving when you are walking. You should have a 2-3 finger-width space between the armpit and the pad. (Non-Weight bearing shown)    3. When sitting:  a. Back up to chair until the back of the uninvolved good leg touches the chair or seat.    b. Move both crutches to the \"injured leg\" side. c. Hold crutches at hand  area. d. Reach back with free hand for arm of chair, slide involved leg forward and lower yourself slowly onto seat.                                              (Note: The pictures above show a non-weight bearing patient negotiating stairs and the injured leg away from load bearing. If you are allowed limited or full weight bearing on your surgical leg/foot, the surgical leg position should correspond to the location of the crutches on the stair step.)    4. When climbing stairs, remember: \"Up with the good; down with the bad. \"             a. This means when going up stairs, you: step your good leg up first, then the crutches and your bad leg follow. b. When going down stairs, the crutches and your bad leg step down first, followed by your good leg. Remember: Take it slowly! In the examples above you are shown non-weight bearing as this is the most difficult technique to master. However, partial weight bearing is also used depending on what your doctor prescribes after surgery. With partial weight bearin. The injured leg is allowed to carry a prescribed amount of your body weight. 2. Touch-down weight bearing - toes touch the ground with least amount of weight bearing  3. 25% - 100% weight bearing - allow for a quarter to all of your body weight to be carried by your injured and healing leg, depending on your doctors orders. 4. Typically, apply as much weight to your injured leg as you can tolerate. If there is pain, you may be doing too much weight bearing and you may need to slow your progress down. \"Let pain be your guide. \"      Most importantly, follow your doctor's instructions carefully. Doing too much too soon can possibly impede your healing progress and delay your recovery. Hints and Safety:   Never leave crutches behind and attempt to hop where you are going.  Always use good posture. Do not lean on arm pits, this can cause severe nerve damage in arms.  Inspect crutch tips periodically and replace as necessary.  Dont play on crutches.  Remove or set aside things on the floor that can trip someone on crutches, like throw rugs, loose wires or extension cords, clutter on the floor.    Use caution if you have slick, waxed or wet floors, small pets, uneven floors or deep carpet. Be careful, think, and take your time! []    A Physical Therapy referral was made before discharge for crutch training and evaluation.

## 2021-03-13 NOTE — PERIOP NOTES
Crutch training provided to the patient. Patient demonstrated teaching back at a satisfactory level.  Crutch training information added to discharge paperwork

## 2021-03-13 NOTE — ANESTHESIA POSTPROCEDURE EVALUATION
Procedure(s):  FIRST TARSOMETATARSAL JOINT FUSION RIGHTFOOT,  BUNIONECTOMY WITH POSSIBLE HALLUX OSTEOTOMY RIGHT FOOT (ANES MAC/POP WITH SAPHENOUS WITH CATHETER).     regional, MAC    Anesthesia Post Evaluation      Multimodal analgesia: multimodal analgesia not used between 6 hours prior to anesthesia start to PACU discharge  Patient location during evaluation: PACU  Patient participation: complete - patient participated  Level of consciousness: awake and alert  Pain score: 0  Pain management: satisfactory to patient  Airway patency: patent  Anesthetic complications: no  Cardiovascular status: acceptable  Respiratory status: acceptable  Hydration status: acceptable  Post anesthesia nausea and vomiting:  none  Final Post Anesthesia Temperature Assessment:  Normothermia (36.0-37.5 degrees C)      INITIAL Post-op Vital signs:   Vitals Value Taken Time   /73 03/12/21 1855   Temp 36.4 °C (97.6 °F) 03/12/21 1855   Pulse 71 03/12/21 1855   Resp 20 03/12/21 1855   SpO2 92 % 03/12/21 1855

## (undated) DEVICE — BLADE SAW W5.5XL18MM THK0.38MM CUT THK0.43MM REPL S STL SAG

## (undated) DEVICE — POSITIONING PIN

## (undated) DEVICE — PADDING CST 4INX4YD --

## (undated) DEVICE — SUTURE COAT VCRL SZ 5-0 L18IN ABSRB UD L19MM PS-2 1/2 CIR J495G

## (undated) DEVICE — SUTURE ETHLN SZ 4-0 L18IN NONABSORBABLE BLK L19MM PS-2 3/8 1667H

## (undated) DEVICE — REM POLYHESIVE ADULT PATIENT RETURN ELECTRODE: Brand: VALLEYLAB

## (undated) DEVICE — DRSG GZ OIL EMUL CURAD 3X3 --

## (undated) DEVICE — SUT PROL 4-0 18IN P3 BLU --

## (undated) DEVICE — INTENDED FOR TISSUE SEPARATION, AND OTHER PROCEDURES THAT REQUIRE A SHARP SURGICAL BLADE TO PUNCTURE OR CUT.: Brand: BARD-PARKER ® CARBON RIB-BACK BLADES

## (undated) DEVICE — STANDARD SURGICAL GOWN, L: Brand: CONVERTORS

## (undated) DEVICE — COVER LT HNDL PLAS RIG 1 PER PK

## (undated) DEVICE — SUTURE VCRL SZ 3-0 L18IN ABSRB UD PS-2 L19MM 3/8 CRV PRIM J497H

## (undated) DEVICE — SUTURE VCRL SZ 4-0 L18IN ABSRB UD L19MM PS-2 3/8 CIR PRIM J496H

## (undated) DEVICE — PREP KIT PEEL PTCH POVIDONE IOD

## (undated) DEVICE — TUBING, SUCTION, 1/4" X 10', STRAIGHT: Brand: MEDLINE

## (undated) DEVICE — BANDAGE,ELASTIC,ESMARK,STERILE,4"X9',LF: Brand: MEDLINE

## (undated) DEVICE — DRAPE C-ARM OEC ELIT MINVIEW -- WITH PLATE PROTECTOR

## (undated) DEVICE — NEEDLE HYPO 25GA L1.5IN BVL ORIENTED ECLIPSE

## (undated) DEVICE — DRAPE,EXTREMITY,89X128,STERILE: Brand: MEDLINE

## (undated) DEVICE — STRAP,POSITIONING,KNEE/BODY,FOAM,4X60": Brand: MEDLINE

## (undated) DEVICE — STANDARD DRILL BIT , AO

## (undated) DEVICE — SPONGE GZ W4XL4IN COT 12 PLY TYP VII WVN C FLD DSGN

## (undated) DEVICE — Device

## (undated) DEVICE — SOL IRR SOD CL 0.9% 1000ML BTL --

## (undated) DEVICE — STERILE POLYISOPRENE POWDER-FREE SURGICAL GLOVES WITH EMOLLIENT COATING: Brand: PROTEXIS

## (undated) DEVICE — PREP SKN CHLRAPRP APL 26ML STR --

## (undated) DEVICE — BLADE OSC SAW 40X11MM -- STRKER

## (undated) DEVICE — SUTURE FIBERWIRE 2-0 L18IN NONABSORBABLE BLU L17.9MM 3/8 AR7220

## (undated) DEVICE — BANDAGE COBAN 4 IN COMPR W4INXL5YD FOAM COHESIVE QUIK STK SELF ADH SFT

## (undated) DEVICE — DRAPE SHT 3 QTR PROXIMA 53X77 --

## (undated) DEVICE — BNDG ELAS HK LOOP 4X5YD NS -- MATRIX

## (undated) DEVICE — ZIMMER® STERILE DISPOSABLE TOURNIQUET CUFF WITH PROTECTIVE SLEEVE AND PLC, DUAL PORT, SINGLE BLADDER, 18 IN. (46 CM)

## (undated) DEVICE — NDL PRT INJ NSAF BLNT 18GX1.5 --

## (undated) DEVICE — STERILE POLYISOPRENE POWDER-FREE SURGICAL GLOVES: Brand: PROTEXIS

## (undated) DEVICE — SYR 10ML LUER LOK 1/5ML GRAD --

## (undated) DEVICE — SPONGE GZ W4XL4IN COT RADPQ HIGHLY ABSRB

## (undated) DEVICE — TUBING SUCT 10FR MAL ALUM SHFT FN CAP VENT UNIV CONN W/ OBT

## (undated) DEVICE — DERMABOND SKIN ADH 0.7ML -- DERMABOND ADVANCED 12/BX

## (undated) DEVICE — CANISTER, RIGID, 3000CC: Brand: MEDLINE INDUSTRIES, INC.

## (undated) DEVICE — 1010 S-DRAPE TOWEL DRAPE 10/BX: Brand: STERI-DRAPE™

## (undated) DEVICE — SYRINGE IRRIG 60ML SFT PLIABLE BLB EZ TO GRP 1 HND USE W/